# Patient Record
Sex: FEMALE | Race: WHITE | NOT HISPANIC OR LATINO | Employment: OTHER | URBAN - METROPOLITAN AREA
[De-identification: names, ages, dates, MRNs, and addresses within clinical notes are randomized per-mention and may not be internally consistent; named-entity substitution may affect disease eponyms.]

---

## 2022-01-01 ENCOUNTER — LAB REQUISITION (OUTPATIENT)
Dept: LAB | Facility: CLINIC | Age: 77
End: 2022-01-01
Payer: COMMERCIAL

## 2022-01-01 ENCOUNTER — PATIENT OUTREACH (OUTPATIENT)
Dept: CARE COORDINATION | Facility: CLINIC | Age: 77
End: 2022-01-01
Payer: COMMERCIAL

## 2022-01-01 ENCOUNTER — APPOINTMENT (OUTPATIENT)
Dept: CT IMAGING | Facility: CLINIC | Age: 77
DRG: 189 | End: 2022-01-01
Attending: EMERGENCY MEDICINE
Payer: COMMERCIAL

## 2022-01-01 ENCOUNTER — HOSPITAL ENCOUNTER (INPATIENT)
Facility: CLINIC | Age: 77
LOS: 1 days | Discharge: HOSPICE | DRG: 189 | End: 2022-06-03
Attending: EMERGENCY MEDICINE | Admitting: STUDENT IN AN ORGANIZED HEALTH CARE EDUCATION/TRAINING PROGRAM
Payer: COMMERCIAL

## 2022-01-01 VITALS
HEART RATE: 61 BPM | WEIGHT: 284 LBS | RESPIRATION RATE: 40 BRPM | DIASTOLIC BLOOD PRESSURE: 97 MMHG | OXYGEN SATURATION: 55 % | TEMPERATURE: 97.4 F | SYSTOLIC BLOOD PRESSURE: 191 MMHG

## 2022-01-01 DIAGNOSIS — E78.2 MIXED HYPERLIPIDEMIA: ICD-10-CM

## 2022-01-01 DIAGNOSIS — J96.01 ACUTE RESPIRATORY FAILURE WITH HYPOXIA AND HYPERCAPNIA (H): ICD-10-CM

## 2022-01-01 DIAGNOSIS — R32 UNSPECIFIED URINARY INCONTINENCE: ICD-10-CM

## 2022-01-01 DIAGNOSIS — M13.0 POLYARTHRITIS, UNSPECIFIED: ICD-10-CM

## 2022-01-01 DIAGNOSIS — E87.6 HYPOKALEMIA: ICD-10-CM

## 2022-01-01 DIAGNOSIS — Z71.89 OTHER SPECIFIED COUNSELING: ICD-10-CM

## 2022-01-01 DIAGNOSIS — J96.02 ACUTE RESPIRATORY FAILURE WITH HYPOXIA AND HYPERCAPNIA (H): ICD-10-CM

## 2022-01-01 DIAGNOSIS — G31.83 LEWY BODY DEMENTIA WITH BEHAVIORAL DISTURBANCE (H): ICD-10-CM

## 2022-01-01 DIAGNOSIS — F02.818 LEWY BODY DEMENTIA WITH BEHAVIORAL DISTURBANCE (H): ICD-10-CM

## 2022-01-01 DIAGNOSIS — I10 ESSENTIAL (PRIMARY) HYPERTENSION: ICD-10-CM

## 2022-01-01 LAB
ALBUMIN SERPL-MCNC: 3.1 G/DL (ref 3.5–5)
ALBUMIN UR-MCNC: 10 MG/DL
ALBUMIN UR-MCNC: NEGATIVE MG/DL
ALP SERPL-CCNC: 78 U/L (ref 45–120)
ALT SERPL W P-5'-P-CCNC: 11 U/L (ref 0–45)
ANION GAP SERPL CALCULATED.3IONS-SCNC: 1 MMOL/L (ref 3–14)
ANION GAP SERPL CALCULATED.3IONS-SCNC: 10 MMOL/L (ref 5–18)
APPEARANCE UR: ABNORMAL
APPEARANCE UR: ABNORMAL
AST SERPL W P-5'-P-CCNC: 14 U/L (ref 0–40)
BACTERIA #/AREA URNS HPF: ABNORMAL /HPF
BACTERIA #/AREA URNS HPF: ABNORMAL /HPF
BACTERIA UR CULT: ABNORMAL
BASE EXCESS BLDV CALC-SCNC: 6.9 MMOL/L (ref -7.7–1.9)
BASOPHILS # BLD AUTO: 0 10E3/UL (ref 0–0.2)
BASOPHILS NFR BLD AUTO: 0 %
BILIRUB SERPL-MCNC: 0.7 MG/DL (ref 0–1)
BILIRUB UR QL STRIP: NEGATIVE
BILIRUB UR QL STRIP: NEGATIVE
BUN SERPL-MCNC: 14 MG/DL (ref 7–30)
BUN SERPL-MCNC: 8 MG/DL (ref 8–28)
CALCIUM SERPL-MCNC: 9 MG/DL (ref 8.5–10.1)
CALCIUM SERPL-MCNC: 9.5 MG/DL (ref 8.5–10.5)
CAOX CRY #/AREA URNS HPF: ABNORMAL /HPF
CHLORIDE BLD-SCNC: 100 MMOL/L (ref 98–107)
CHLORIDE BLD-SCNC: 99 MMOL/L (ref 94–109)
CHOLEST SERPL-MCNC: 109 MG/DL
CO2 SERPL-SCNC: 34 MMOL/L (ref 22–31)
CO2 SERPL-SCNC: 40 MMOL/L (ref 20–32)
COLOR UR AUTO: ABNORMAL
COLOR UR AUTO: YELLOW
CREAT SERPL-MCNC: 0.56 MG/DL (ref 0.52–1.04)
CREAT SERPL-MCNC: 0.63 MG/DL (ref 0.6–1.1)
DEPRECATED CALCIDIOL+CALCIFEROL SERPL-MC: 75 UG/L (ref 20–75)
EOSINOPHIL # BLD AUTO: 0 10E3/UL (ref 0–0.7)
EOSINOPHIL NFR BLD AUTO: 0 %
ERYTHROCYTE [DISTWIDTH] IN BLOOD BY AUTOMATED COUNT: 14.3 % (ref 10–15)
ERYTHROCYTE [DISTWIDTH] IN BLOOD BY AUTOMATED COUNT: 15 % (ref 10–15)
FASTING STATUS PATIENT QL REPORTED: NO
FLUAV RNA SPEC QL NAA+PROBE: NEGATIVE
FLUBV RNA RESP QL NAA+PROBE: NEGATIVE
GFR SERPL CREATININE-BSD FRML MDRD: >90 ML/MIN/1.73M2
GFR SERPL CREATININE-BSD FRML MDRD: >90 ML/MIN/1.73M2
GLUCOSE BLD-MCNC: 100 MG/DL (ref 70–125)
GLUCOSE BLD-MCNC: 119 MG/DL (ref 70–99)
GLUCOSE UR STRIP-MCNC: NEGATIVE MG/DL
GLUCOSE UR STRIP-MCNC: NEGATIVE MG/DL
HCO3 BLDV-SCNC: 38 MMOL/L (ref 21–28)
HCT VFR BLD AUTO: 38.5 % (ref 35–47)
HCT VFR BLD AUTO: 42.6 % (ref 35–47)
HDLC SERPL-MCNC: 52 MG/DL
HGB BLD-MCNC: 11.4 G/DL (ref 11.7–15.7)
HGB BLD-MCNC: 12.3 G/DL (ref 11.7–15.7)
HGB UR QL STRIP: NEGATIVE
HGB UR QL STRIP: NEGATIVE
HOLD SPECIMEN: NORMAL
IMM GRANULOCYTES # BLD: 0 10E3/UL
IMM GRANULOCYTES NFR BLD: 0 %
KETONES UR STRIP-MCNC: NEGATIVE MG/DL
KETONES UR STRIP-MCNC: NEGATIVE MG/DL
LDLC SERPL CALC-MCNC: 43 MG/DL
LEUKOCYTE ESTERASE UR QL STRIP: ABNORMAL
LEUKOCYTE ESTERASE UR QL STRIP: ABNORMAL
LYMPHOCYTES # BLD AUTO: 1.6 10E3/UL (ref 0.8–5.3)
LYMPHOCYTES NFR BLD AUTO: 16 %
MCH RBC QN AUTO: 26.2 PG (ref 26.5–33)
MCH RBC QN AUTO: 26.7 PG (ref 26.5–33)
MCHC RBC AUTO-ENTMCNC: 28.9 G/DL (ref 31.5–36.5)
MCHC RBC AUTO-ENTMCNC: 29.6 G/DL (ref 31.5–36.5)
MCV RBC AUTO: 89 FL (ref 78–100)
MCV RBC AUTO: 92 FL (ref 78–100)
MONOCYTES # BLD AUTO: 0.8 10E3/UL (ref 0–1.3)
MONOCYTES NFR BLD AUTO: 8 %
MUCOUS THREADS #/AREA URNS LPF: PRESENT /LPF
MUCOUS THREADS #/AREA URNS LPF: PRESENT /LPF
NEUTROPHILS # BLD AUTO: 7.5 10E3/UL (ref 1.6–8.3)
NEUTROPHILS NFR BLD AUTO: 76 %
NITRATE UR QL: NEGATIVE
NITRATE UR QL: POSITIVE
NRBC # BLD AUTO: 0 10E3/UL
NRBC BLD AUTO-RTO: 0 /100
O2/TOTAL GAS SETTING VFR VENT: 3 %
PCO2 BLDV: 95 MM HG (ref 40–50)
PH BLDV: 7.21 [PH] (ref 7.32–7.43)
PH UR STRIP: 6 [PH] (ref 5–7)
PH UR STRIP: 6 [PH] (ref 5–7)
PLATELET # BLD AUTO: 249 10E3/UL (ref 150–450)
PLATELET # BLD AUTO: 286 10E3/UL (ref 150–450)
PO2 BLDV: 61 MM HG (ref 25–47)
POTASSIUM BLD-SCNC: 3.4 MMOL/L (ref 3.5–5)
POTASSIUM BLD-SCNC: 4.2 MMOL/L (ref 3.4–5.3)
POTASSIUM BLD-SCNC: 4.3 MMOL/L (ref 3.5–5)
PROT SERPL-MCNC: 5.9 G/DL (ref 6–8)
RBC # BLD AUTO: 4.35 10E6/UL (ref 3.8–5.2)
RBC # BLD AUTO: 4.61 10E6/UL (ref 3.8–5.2)
RBC URINE: 1 /HPF
RBC URINE: 2 /HPF
RSV RNA SPEC NAA+PROBE: NEGATIVE
SARS-COV-2 RNA RESP QL NAA+PROBE: NEGATIVE
SODIUM SERPL-SCNC: 140 MMOL/L (ref 133–144)
SODIUM SERPL-SCNC: 144 MMOL/L (ref 136–145)
SP GR UR STRIP: 1.02 (ref 1–1.03)
SP GR UR STRIP: 1.02 (ref 1–1.03)
SQUAMOUS EPITHELIAL: 14 /HPF
SQUAMOUS EPITHELIAL: 3 /HPF
TRANSITIONAL EPI: 1 /HPF
TRIGL SERPL-MCNC: 72 MG/DL
UROBILINOGEN UR STRIP-MCNC: <2 MG/DL
UROBILINOGEN UR STRIP-MCNC: <2 MG/DL
WBC # BLD AUTO: 10 10E3/UL (ref 4–11)
WBC # BLD AUTO: 9.8 10E3/UL (ref 4–11)
WBC URINE: 69 /HPF
WBC URINE: 7 /HPF

## 2022-01-01 PROCEDURE — 999N000157 HC STATISTIC RCP TIME EA 10 MIN

## 2022-01-01 PROCEDURE — 82306 VITAMIN D 25 HYDROXY: CPT | Mod: ORL | Performed by: NURSE PRACTITIONER

## 2022-01-01 PROCEDURE — 87186 SC STD MICRODIL/AGAR DIL: CPT | Mod: ORL | Performed by: FAMILY MEDICINE

## 2022-01-01 PROCEDURE — 85025 COMPLETE CBC W/AUTO DIFF WBC: CPT | Performed by: EMERGENCY MEDICINE

## 2022-01-01 PROCEDURE — 82803 BLOOD GASES ANY COMBINATION: CPT | Performed by: EMERGENCY MEDICINE

## 2022-01-01 PROCEDURE — 120N000001 HC R&B MED SURG/OB

## 2022-01-01 PROCEDURE — 94660 CPAP INITIATION&MGMT: CPT

## 2022-01-01 PROCEDURE — 84132 ASSAY OF SERUM POTASSIUM: CPT | Mod: ORL | Performed by: FAMILY MEDICINE

## 2022-01-01 PROCEDURE — 80053 COMPREHEN METABOLIC PANEL: CPT | Mod: ORL | Performed by: NURSE PRACTITIONER

## 2022-01-01 PROCEDURE — 250N000013 HC RX MED GY IP 250 OP 250 PS 637: Performed by: EMERGENCY MEDICINE

## 2022-01-01 PROCEDURE — 87637 SARSCOV2&INF A&B&RSV AMP PRB: CPT | Performed by: EMERGENCY MEDICINE

## 2022-01-01 PROCEDURE — P9603 ONE-WAY ALLOW PRORATED MILES: HCPCS | Mod: ORL | Performed by: NURSE PRACTITIONER

## 2022-01-01 PROCEDURE — 250N000009 HC RX 250: Performed by: EMERGENCY MEDICINE

## 2022-01-01 PROCEDURE — 5A09357 ASSISTANCE WITH RESPIRATORY VENTILATION, LESS THAN 24 CONSECUTIVE HOURS, CONTINUOUS POSITIVE AIRWAY PRESSURE: ICD-10-PCS | Performed by: EMERGENCY MEDICINE

## 2022-01-01 PROCEDURE — C9803 HOPD COVID-19 SPEC COLLECT: HCPCS

## 2022-01-01 PROCEDURE — 36415 COLL VENOUS BLD VENIPUNCTURE: CPT | Mod: ORL | Performed by: FAMILY MEDICINE

## 2022-01-01 PROCEDURE — 250N000011 HC RX IP 250 OP 636: Performed by: EMERGENCY MEDICINE

## 2022-01-01 PROCEDURE — 80061 LIPID PANEL: CPT | Mod: ORL | Performed by: NURSE PRACTITIONER

## 2022-01-01 PROCEDURE — 80048 BASIC METABOLIC PNL TOTAL CA: CPT | Performed by: EMERGENCY MEDICINE

## 2022-01-01 PROCEDURE — 99291 CRITICAL CARE FIRST HOUR: CPT

## 2022-01-01 PROCEDURE — 36415 COLL VENOUS BLD VENIPUNCTURE: CPT | Performed by: EMERGENCY MEDICINE

## 2022-01-01 PROCEDURE — 85027 COMPLETE CBC AUTOMATED: CPT | Mod: ORL | Performed by: NURSE PRACTITIONER

## 2022-01-01 PROCEDURE — 81001 URINALYSIS AUTO W/SCOPE: CPT | Mod: ORL | Performed by: FAMILY MEDICINE

## 2022-01-01 PROCEDURE — 36415 COLL VENOUS BLD VENIPUNCTURE: CPT | Mod: ORL | Performed by: NURSE PRACTITIONER

## 2022-01-01 PROCEDURE — 71275 CT ANGIOGRAPHY CHEST: CPT

## 2022-01-01 PROCEDURE — 99222 1ST HOSP IP/OBS MODERATE 55: CPT | Mod: AI | Performed by: STUDENT IN AN ORGANIZED HEALTH CARE EDUCATION/TRAINING PROGRAM

## 2022-01-01 PROCEDURE — 96374 THER/PROPH/DIAG INJ IV PUSH: CPT | Mod: 59

## 2022-01-01 PROCEDURE — 96376 TX/PRO/DX INJ SAME DRUG ADON: CPT

## 2022-01-01 RX ORDER — BISACODYL 10 MG
10 SUPPOSITORY, RECTAL RECTAL
Status: CANCELLED | OUTPATIENT
Start: 2022-06-05

## 2022-01-01 RX ORDER — PROCHLORPERAZINE 25 MG
12.5 SUPPOSITORY, RECTAL RECTAL EVERY 12 HOURS PRN
Status: CANCELLED | OUTPATIENT
Start: 2022-01-01

## 2022-01-01 RX ORDER — IOPAMIDOL 755 MG/ML
500 INJECTION, SOLUTION INTRAVASCULAR ONCE
Status: COMPLETED | OUTPATIENT
Start: 2022-01-01 | End: 2022-01-01

## 2022-01-01 RX ORDER — ACETAMINOPHEN 325 MG/1
650 TABLET ORAL EVERY 6 HOURS PRN
Status: CANCELLED | OUTPATIENT
Start: 2022-01-01

## 2022-01-01 RX ORDER — NALOXONE HYDROCHLORIDE 0.4 MG/ML
0.1 INJECTION, SOLUTION INTRAMUSCULAR; INTRAVENOUS; SUBCUTANEOUS
Status: CANCELLED | OUTPATIENT
Start: 2022-01-01

## 2022-01-01 RX ORDER — LIDOCAINE 40 MG/G
CREAM TOPICAL
Status: CANCELLED | OUTPATIENT
Start: 2022-01-01

## 2022-01-01 RX ORDER — ONDANSETRON 2 MG/ML
4 INJECTION INTRAMUSCULAR; INTRAVENOUS EVERY 6 HOURS PRN
Status: CANCELLED | OUTPATIENT
Start: 2022-01-01

## 2022-01-01 RX ORDER — GLYCOPYRROLATE 0.2 MG/ML
0.2 INJECTION, SOLUTION INTRAMUSCULAR; INTRAVENOUS EVERY 4 HOURS PRN
Status: CANCELLED | OUTPATIENT
Start: 2022-01-01

## 2022-01-01 RX ORDER — PROCHLORPERAZINE MALEATE 5 MG
5 TABLET ORAL EVERY 6 HOURS PRN
Status: CANCELLED | OUTPATIENT
Start: 2022-01-01

## 2022-01-01 RX ORDER — LORAZEPAM 1 MG/1
1 TABLET ORAL
Status: CANCELLED | OUTPATIENT
Start: 2022-01-01

## 2022-01-01 RX ORDER — MORPHINE SULFATE 20 MG/ML
5-10 SOLUTION ORAL
Status: CANCELLED | OUTPATIENT
Start: 2022-01-01

## 2022-01-01 RX ORDER — HALOPERIDOL 5 MG/ML
2.5 INJECTION INTRAMUSCULAR ONCE
Status: DISCONTINUED | OUTPATIENT
Start: 2022-01-01 | End: 2022-01-01

## 2022-01-01 RX ORDER — ACETAMINOPHEN 650 MG/1
650 SUPPOSITORY RECTAL EVERY 6 HOURS PRN
Status: CANCELLED | OUTPATIENT
Start: 2022-01-01

## 2022-01-01 RX ORDER — HALOPERIDOL 5 MG/ML
2.5 INJECTION INTRAMUSCULAR ONCE
Status: COMPLETED | OUTPATIENT
Start: 2022-01-01 | End: 2022-01-01

## 2022-01-01 RX ORDER — ATROPINE SULFATE 10 MG/ML
2 SOLUTION/ DROPS OPHTHALMIC EVERY 4 HOURS PRN
Status: CANCELLED | OUTPATIENT
Start: 2022-01-01

## 2022-01-01 RX ORDER — NALOXONE HYDROCHLORIDE 0.4 MG/ML
0.2 INJECTION, SOLUTION INTRAMUSCULAR; INTRAVENOUS; SUBCUTANEOUS
Status: CANCELLED | OUTPATIENT
Start: 2022-01-01

## 2022-01-01 RX ORDER — GLYCOPYRROLATE 1 MG/1
2 TABLET ORAL EVERY 4 HOURS PRN
Status: CANCELLED | OUTPATIENT
Start: 2022-01-01

## 2022-01-01 RX ORDER — LORAZEPAM 2 MG/ML
1 INJECTION INTRAMUSCULAR
Status: CANCELLED | OUTPATIENT
Start: 2022-01-01

## 2022-01-01 RX ORDER — MORPHINE SULFATE 4 MG/ML
4 INJECTION, SOLUTION INTRAMUSCULAR; INTRAVENOUS
Status: DISCONTINUED | OUTPATIENT
Start: 2022-01-01 | End: 2022-01-01 | Stop reason: HOSPADM

## 2022-01-01 RX ORDER — ONDANSETRON 4 MG/1
4 TABLET, ORALLY DISINTEGRATING ORAL EVERY 6 HOURS PRN
Status: CANCELLED | OUTPATIENT
Start: 2022-01-01

## 2022-01-01 RX ORDER — OLANZAPINE 5 MG/1
5 TABLET, ORALLY DISINTEGRATING ORAL EVERY 4 HOURS PRN
Status: DISCONTINUED | OUTPATIENT
Start: 2022-01-01 | End: 2022-01-01 | Stop reason: HOSPADM

## 2022-01-01 RX ORDER — MORPHINE SULFATE 10 MG/5ML
5-10 SOLUTION ORAL
Status: CANCELLED | OUTPATIENT
Start: 2022-01-01

## 2022-01-01 RX ORDER — MORPHINE SULFATE 10 MG/5ML
10 SOLUTION ORAL
Status: DISCONTINUED | OUTPATIENT
Start: 2022-01-01 | End: 2022-01-01 | Stop reason: HOSPADM

## 2022-01-01 RX ORDER — ACETAMINOPHEN 325 MG/10.15ML
650 LIQUID ORAL EVERY 6 HOURS PRN
Status: CANCELLED | OUTPATIENT
Start: 2022-01-01

## 2022-01-01 RX ORDER — HALOPERIDOL 5 MG/ML
2 INJECTION INTRAMUSCULAR
Status: CANCELLED | OUTPATIENT
Start: 2022-01-01

## 2022-01-01 RX ADMIN — MORPHINE SULFATE 10 MG: 10 SOLUTION ORAL at 00:22

## 2022-01-01 RX ADMIN — IOPAMIDOL 90 ML: 755 INJECTION, SOLUTION INTRAVENOUS at 20:05

## 2022-01-01 RX ADMIN — HALOPERIDOL LACTATE 2.5 MG: 5 INJECTION, SOLUTION INTRAMUSCULAR at 19:48

## 2022-01-01 RX ADMIN — OLANZAPINE 5 MG: 5 TABLET, ORALLY DISINTEGRATING ORAL at 23:10

## 2022-01-01 RX ADMIN — HALOPERIDOL LACTATE 2.5 MG: 5 INJECTION, SOLUTION INTRAMUSCULAR at 20:39

## 2022-01-01 RX ADMIN — SODIUM CHLORIDE 100 ML: 9 INJECTION, SOLUTION INTRAVENOUS at 20:04

## 2022-01-01 ASSESSMENT — ACTIVITIES OF DAILY LIVING (ADL)
ADLS_ACUITY_SCORE: 35

## 2022-06-02 PROBLEM — G31.83 LEWY BODY DEMENTIA WITH BEHAVIORAL DISTURBANCE (H): Status: ACTIVE | Noted: 2022-01-01

## 2022-06-02 PROBLEM — J96.02 ACUTE RESPIRATORY FAILURE WITH HYPOXIA AND HYPERCAPNIA (H): Status: ACTIVE | Noted: 2022-01-01

## 2022-06-02 PROBLEM — J96.01 ACUTE RESPIRATORY FAILURE WITH HYPOXIA AND HYPERCAPNIA (H): Status: ACTIVE | Noted: 2022-01-01

## 2022-06-02 PROBLEM — F02.818 LEWY BODY DEMENTIA WITH BEHAVIORAL DISTURBANCE (H): Status: ACTIVE | Noted: 2022-01-01

## 2022-06-02 NOTE — ED TRIAGE NOTES
Presents via EMS from care facility with hypoxia upon evening vital signs. 60% on RA. Up to 94% on 3 lpm O2 via nc upon arrival to ED. Hx dementia, disoriented x 4 at baseline. Hx of lung mass with no further info from ems and currently has pitting edema. 12 lead unremarkable le in route.

## 2022-06-02 NOTE — ED PROVIDER NOTES
History     Chief Complaint:  hypoxia       HPI   Nicole Gaviria is a 76 year old female who presents with shortness of breath from a group home.  Apparently she has dementia.  Today she is complaining only of pain in her buttocks.  Does not provide any other information.  EMS reports she was noted to be hypoxic with O2 sats in the 60s improved with supplemental oxygen after they arrived.    Patient's sister arrived and I was able to speak with her.  She saw the patient yesterday and noticed she seemed more confused having hallucinations.  Patient has Lewy body dementia and has been in progressive decline for a while.  She is DNR/DNI.  This was confirmed by the patient's sister.    ROS:  Review of Systems   Unable to perform ROS: Dementia          Allergies:  Amoxicillin-Pot Clavulanate  Omeprazole     Medications:    No current outpatient medications on file.      Past Medical History:    No past medical history on file.    Past Surgical History:    No past surgical history on file.     Family History:    family history is not on file.    Social History:     PCP: No primary care provider on file.     Physical Exam     Patient Vitals for the past 24 hrs:   BP Temp Temp src Pulse Resp SpO2 Weight   06/02/22 1845 (!) 191/97 -- -- 61 24 94 % --   06/02/22 1844 -- -- -- -- -- -- 128.8 kg (284 lb)   06/02/22 1832 (!) 175/104 97.4  F (36.3  C) Axillary 63 -- 96 % --   06/02/22 1830 (!) 191/89 -- -- 61 -- 95 % --   06/02/22 1815 (!) 185/87 -- -- 63 -- 97 % --   06/02/22 1800 (!) 175/104 -- -- 65 -- 95 % --   06/02/22 1758 -- -- -- -- 17 94 % --        Physical Exam  Gen: Somnolent but arouses to verbal stimuli,   Oral : Mucous membranes moist,   Nose: No rhinorhea, nasal cannula oxygen in place  Ears: External near normal, without drainage  Eyes: periorbital tissues and sclera normal   Neck: supple, no abnormal swelling  Lungs: Clear bilaterally, no tachypnea or distress, speaks full sentences  CV: Regular rate, regular  rhythm  Abd: soft, nontender, nondistended, no rebound/guarding  Ext: Large legs with symmetric edema  Skin: Stage I pressure ulcer on sacrum, yeast infection of pannus  Neuro: Somnolent, rouses to verbal stimuli, not oriented, moves 4 extremities independently  Psych: pleasant mood, normal affect      Emergency Department Course   ECG:  No results found for this or any previous visit.    Imaging:  CT Chest Pulmonary Embolism w Contrast   Final Result   IMPRESSION:   1.  Limited exam due to respiratory motion. No central or definite peripheral pulmonary artery embolism.   2.  Trace bilateral pleural effusions with mild adjacent atelectasis/infiltrates.   3.  Bilateral multilobar solid pulmonary nodules measuring up to 9 mm. These may be postinfectious or postinflammatory. However, pulmonary metastases cannot be excluded. Recommend correlation and at least short interval CT follow-up within 3 months.   4.  Bilateral indeterminate adrenal gland nodules. Recommend attention on short interval follow-up. Nonemergent further evaluation with adrenal CT may be helpful.   5.  Mild mediastinal adenopathy.      REFERENCE:   Guidelines for Management of Incidental Pulmonary Nodules Detected on CT Images: From the Fleischner Society 2017.    Guidelines apply to incidental nodules in patients who are 35 years or older.   Guidelines do not apply to lung cancer screening, patients with immunosuppression, or patients with known primary cancer.      MULTIPLE NODULES      Nodule size 6 mm or larger   Low-risk patients: Follow-up CT at 3-6 months, then consider CT at 18-24 months.   High-risk patients: Follow-up CT at 3-6 months, then at 18-24 months if no change.   -Use most suspicious nodule as guide to management.            Report per radiology    Laboratory:  Labs Ordered and Resulted from Time of ED Arrival to Time of ED Departure   BASIC METABOLIC PANEL - Abnormal       Result Value    Sodium 140      Potassium 4.2      Chloride  99      Carbon Dioxide (CO2) 40 (*)     Anion Gap 1 (*)     Urea Nitrogen 14      Creatinine 0.56      Calcium 9.0      Glucose 119 (*)     GFR Estimate >90     BLOOD GAS VENOUS - Abnormal    pH Venous 7.21 (*)     pCO2 Venous 95 (*)     pO2 Venous 61 (*)     Bicarbonate Venous 38 (*)     Base Excess/Deficit (+/-) 6.9 (*)     FIO2 3     CBC WITH PLATELETS AND DIFFERENTIAL - Abnormal    WBC Count 10.0      RBC Count 4.61      Hemoglobin 12.3      Hematocrit 42.6      MCV 92      MCH 26.7      MCHC 28.9 (*)     RDW 15.0      Platelet Count 286      % Neutrophils 76      % Lymphocytes 16      % Monocytes 8      % Eosinophils 0      % Basophils 0      % Immature Granulocytes 0      NRBCs per 100 WBC 0      Absolute Neutrophils 7.5      Absolute Lymphocytes 1.6      Absolute Monocytes 0.8      Absolute Eosinophils 0.0      Absolute Basophils 0.0      Absolute Immature Granulocytes 0.0      Absolute NRBCs 0.0     INFLUENZA A/B & SARS-COV2 PCR MULTIPLEX - Normal    Influenza A PCR Negative      Influenza B PCR Negative      RSV PCR Negative      SARS CoV2 PCR Negative          Procedures       Emergency Department Course:             Reviewed:  I reviewed nursing notes, vitals and past medical history    Assessments:      Consults:       Interventions:  Medications   haloperidol lactate (HALDOL) injection 2.5 mg (has no administration in time range)   CT SCAN FLUSH (100 mLs Intravenous Given 6/2/22 2004)   iopamidol (ISOVUE-370) solution 500 mL (90 mLs Intravenous Given 6/2/22 2005)   haloperidol lactate (HALDOL) injection 2.5 mg (2.5 mg Intravenous Given 6/2/22 1948)        Disposition:  The patient was admitted to the hospital under the care of hospitalist    Impression & Plan    CMS Diagnoses:   and None      Medical Decision Making:  Patient presents emergency department with respiratory failure with hypoxia and hypercapnia.  History of Lewy body dementia and progressive decline.  Initially no family was available so  labs and imaging was obtained.  Patient needed 2.5 of Haldol to allow for a CT scan of her chest.  We temporarily tried the patient on BiPAP which she really did not tolerate she continually pulled out the device and tried to remove it.  She is not oriented to her responding to commands.  Had a good long conversation with the patient's sister and they reviewed the patient's healthcare directives and confirmed that patient is DNR/DNI.  At this point with patient's prior history and patient not wanting aggressive measures the patient's sister, in consultation with her brother over the telephone and myself decided to focus on comfort cares for the patient.  We will do nasal cannula oxygen for comfort as needed, will provide some Haldol and morphine as needed for comfort and to help keep the patient safely in bed.  Contacted hospitalist is in agreement for inpatient management.        Diagnosis:    ICD-10-CM    1. Acute respiratory failure with hypoxia and hypercapnia (H)  J96.01     J96.02    2. Lewy body dementia with behavioral disturbance (H)  G31.83     F02.81         Discharge Medications:  New Prescriptions    No medications on file        6/2/2022   Hans Corona,*        Hans Corona MD  06/02/22 2038

## 2022-06-03 NOTE — ED NOTES
[facility]  ED Nurse Handoff Report    Nicole Gaviria is a 76 year old female   ED Chief complaint: hypoxia  . ED Diagnosis:   Final diagnoses:   Acute respiratory failure with hypoxia and hypercapnia (H)   Lewy body dementia with behavioral disturbance (H)     Allergies:   Allergies   Allergen Reactions     Amoxicillin-Pot Clavulanate Diarrhea     Other reaction(s): Gastrointestinal     Omeprazole Other (See Comments)     PN: dizziness       Code Status: Comfort Care  Activity level - Baseline/Home:  Total Care. Activity Level - Current:   Total Care. Lift room needed: no, on comfort cares Bariatric: No   Needed: No   Isolation: No. Infection: Not Applicable.     Vital Signs:   Vitals:    06/02/22 1832 06/02/22 1844 06/02/22 1845 06/02/22 2000   BP: (!) 175/104  (!) 191/97    Pulse: 63  61    Resp:   24    Temp: 97.4  F (36.3  C)      TempSrc: Axillary      SpO2: 96%  94% 96%   Weight:  128.8 kg (284 lb)         Cardiac Rhythm:  ,      Pain level:    Patient confused: Yes. Patient Falls Risk: Yes.   Elimination Status: due to void   Patient Report - Initial Complaint: Due to void. Focused Assessment:   Presents via EMS from care facility with hypoxia upon evening vital signs. 60% on RA. Up to 94% on 3 lpm O2 via nc upon arrival to ED. Hx dementia, disoriented x 4 at baseline. Hx of lung mass with no further info from ems and currently has pitting edema. 12 lead unremarkable le in route.   Pt has converted to comfort cares per MD. Pt would not leave Bipap on, nor would she keep any monitoring devices on. Sister at bedside. OK'd per MD to keep all monitoring devices and oxygen/bipap off for patient's comfort. Haldol given. Lights dimmed, warm blankets given to patient, and this RN gave patient lotion massage to arm and hands and patient fell asleep. Awaiting family to arrive. Report given to CR Ramon. Notified charge RN of situation. PRN morphine available.  MO     Pt's facility called for  updates. Pt resting in bed comfortably. No signs of distress/discomfort noted. Family at bedside and ok to give updates to the facility.     Nurse Fe calling from the [facility] updated. Nurse verbalizes understanding.   Tests Performed:   CT Chest Pulmonary Embolism w Contrast   Final Result   IMPRESSION:   1.  Limited exam due to respiratory motion. No central or definite peripheral pulmonary artery embolism.   2.  Trace bilateral pleural effusions with mild adjacent atelectasis/infiltrates.   3.  Bilateral multilobar solid pulmonary nodules measuring up to 9 mm. These may be postinfectious or postinflammatory. However, pulmonary metastases cannot be excluded. Recommend correlation and at least short interval CT follow-up within 3 months.   4.  Bilateral indeterminate adrenal gland nodules. Recommend attention on short interval follow-up. Nonemergent further evaluation with adrenal CT may be helpful.   5.  Mild mediastinal adenopathy.      REFERENCE:   Guidelines for Management of Incidental Pulmonary Nodules Detected on CT Images: From the Fleischner Society 2017.    Guidelines apply to incidental nodules in patients who are 35 years or older.   Guidelines do not apply to lung cancer screening, patients with immunosuppression, or patients with known primary cancer.      MULTIPLE NODULES      Nodule size 6 mm or larger   Low-risk patients: Follow-up CT at 3-6 months, then consider CT at 18-24 months.   High-risk patients: Follow-up CT at 3-6 months, then at 18-24 months if no change.   -Use most suspicious nodule as guide to management.           . Abnormal Results:   Labs Ordered and Resulted from Time of ED Arrival to Time of ED Departure   BASIC METABOLIC PANEL - Abnormal       Result Value    Sodium 140      Potassium 4.2      Chloride 99      Carbon Dioxide (CO2) 40 (*)     Anion Gap 1 (*)     Urea Nitrogen 14      Creatinine 0.56      Calcium 9.0      Glucose 119 (*)     GFR Estimate >90     BLOOD GAS  VENOUS - Abnormal    pH Venous 7.21 (*)     pCO2 Venous 95 (*)     pO2 Venous 61 (*)     Bicarbonate Venous 38 (*)     Base Excess/Deficit (+/-) 6.9 (*)     FIO2 3     CBC WITH PLATELETS AND DIFFERENTIAL - Abnormal    WBC Count 10.0      RBC Count 4.61      Hemoglobin 12.3      Hematocrit 42.6      MCV 92      MCH 26.7      MCHC 28.9 (*)     RDW 15.0      Platelet Count 286      % Neutrophils 76      % Lymphocytes 16      % Monocytes 8      % Eosinophils 0      % Basophils 0      % Immature Granulocytes 0      NRBCs per 100 WBC 0      Absolute Neutrophils 7.5      Absolute Lymphocytes 1.6      Absolute Monocytes 0.8      Absolute Eosinophils 0.0      Absolute Basophils 0.0      Absolute Immature Granulocytes 0.0      Absolute NRBCs 0.0     INFLUENZA A/B & SARS-COV2 PCR MULTIPLEX - Normal    Influenza A PCR Negative      Influenza B PCR Negative      RSV PCR Negative      SARS CoV2 PCR Negative       .   Treatments provided: See pt's chart.  Family Comments: Family at bedside. Supportive.  OBS brochure/video discussed/provided to patient:  No  ED Medications:   Medications   morphine (PF) injection 4 mg (has no administration in time range)   CT SCAN FLUSH (100 mLs Intravenous Given 6/2/22 2004)   iopamidol (ISOVUE-370) solution 500 mL (90 mLs Intravenous Given 6/2/22 2005)   haloperidol lactate (HALDOL) injection 2.5 mg (2.5 mg Intravenous Given 6/2/22 1948)   haloperidol lactate (HALDOL) injection 2.5 mg (2.5 mg Intravenous Given 6/2/22 2039)     Drips infusing:  No  For the majority of the shift, the patient's behavior Green. Interventions performed were NA.    Sepsis treatment initiated: No     Patient tested for COVID 19 prior to admission: YES    ED Nurse Name/Phone Number: Tristen Medina RN,   9:56 PM

## 2022-06-03 NOTE — ED NOTES
Pt's facility called for updates. Pt resting in bed comfortably. No signs of distress/discomfort noted. Family at bedside and ok to give updates to the facility.    Nurse Fe calling from the [facility] updated. Nurse verbalizes understanding.

## 2022-06-03 NOTE — PROGRESS NOTES
BiPAP ordered after [facility] CO2 95. Family at bedside states pt has CPAP/BiPAP prescribed but does not wear. Patient on 16/8, 50%.    Javi Stout, RT on 6/2/2022 at 8:40 PM

## 2022-06-03 NOTE — ED NOTES
Pt discharged home to [place]. Sister, Gina, present upon pt leaving hospital. Discharge papers faxed to [phone] upon Simona's request. Pt alert and repositioning self in bed upon departure.

## 2022-06-03 NOTE — ED PROVIDER NOTES
Pt signed out to me by Dr. Mcclellan. In short, pt initially presented with dyspnea found to be significantly hypoxic. Poorly tolerated bipap. Ultimately, after informed conversations with family, decision was made to forgo aggressive measures and transition to comfort care. This AM, at time of sign out, patient is satting 50-70% on room air with labored breathing. She is requesting breakfast which was provided. Director of Nursing at pt's care facility has asked for us to discharge patient back to facility and she notes she has Hospice NP there to initiate Hospice. Pt's brother and sister are both in agreement with this plan. Pt therefore discharged via EMS back to care facility. Also looped in hospitalist service, Dr. Paris, who is in agreement with this plan as well.      Asael Herndon MD  06/03/22 9049

## 2022-06-03 NOTE — ED NOTES
Pt resting in bed. Family at bedside and showed concerns for pt safety as pt can get agitated at times. PRN medications available. Family wondering if pt can be closer to nursing station.    Plan reviewed with charge nurse and OK to move pt to room closer to nursing station.

## 2022-06-03 NOTE — DISCHARGE INSTRUCTIONS
Family has decided upon comfort/care hospice.  Pt to be admitted to hospice upon return to [facility].     Return to hospital for any concerns regarding services.

## 2022-06-03 NOTE — ED NOTES
Pt has converted to comfort cares per MD. Pt would not leave Bipap on, nor would she keep any monitoring devices on. Sister at bedside. OK'd per MD to keep all monitoring devices and oxygen/bipap off for patient's comfort. Haldol given. Lights dimmed, warm blankets given to patient, and this RN gave patient lotion massage to arm and hands and patient fell asleep. Awaiting family to arrive. Report given to CR Ramon. Notified charge RN of situation. PRN morphine available.

## 2022-06-03 NOTE — ED NOTES
Spoke with pt's sister and informed her that the plan is to transfer pt back to facility is pt's condition remains the same by the time EMS can arrive to transport her. DON from [facility] agrees to this plan.     Dr. Herndon present for phone call with PAULA Jarvis at [facility]. Dr. Herndon informed Simona that we will be calling for ambulance transport back to facility.

## 2022-06-03 NOTE — ED NOTES
Pt is alert and eating the broccoli and mushroom omelette that she requested for herself. Pt denies pain.

## 2022-06-03 NOTE — H&P
[facility]    History and Physical - Hospitalist Service       Date of Admission:  6/2/2022    Assessment & Plan      Nicole Gaviria is a 76 year old female admitted on 6/2/2022.     She has Lewy body dementia and lives in assisted living called [facility] in [place].  She has been having worsening of her dementia over the last several months with severe visual hallucinations and has been total care dependent.  She was brought in today for worsening shortness of breath and some buttock pain.  She is not able to give any more information.    Her initial vitals: Blood pressure 175/104, heart rate of 65, oxygen saturation of 94% on 3 L via nasal cannula.  Venous gas showed pH/PCO2 of 7.21/95.  CT chest was negative for PE but had bilateral multilobar pulmonary nodules up to 9 mm, infectious versus metastatic.  He also had bilateral trace pleural effusions with mild adjacent atelectasis/infiltrates. She tested negative for COVID-19, influenza A/B and RSV.  She had an unremarkable CBC and BMP    She was placed on BiPAP initially but then family came by and as per your wishes she was transitioned to comfort cares and bicarb was taken off.  At time of my examination patient winces to painful stimuli but otherwise is unresponsive.    She was transitioned to comfort cares after discussion with her family which included her sister, brother and 2 nieces.  Her  has passed away and she has no kids.    Plan.    1. Acute on chronic hypercapnic respiratory failure.     Unclear etiology, might be due to progressive dementia versus due to drugs used to control her delirium and agitation..  2. Advanced Lewy body dementia.  Transition to comfort cares.  3. Uncontrolled hypertension.           Diet:  Allow as tolerated with  DVT Prophylaxis: Not indicated  Arias Catheter: Not present  Central Lines: None  Cardiac Monitoring: None  Code Status:  DNR/DNI    Clinically Significant Risk Factors Present on Admission                       Disposition Plan   Expected Discharge:  in 1 to 2 days.  She might need to go back to her facility on hospice cares.  Anticipated discharge location:  Awaiting care coordination huddle  Delays:            The patient's care was discussed with the Patient.    Rob Jackson MD  Hospitalist Service  [facility]  Securely message with the Vocera Web Console (learn more here)  Text page via [facility] Paging/Directory         ______________________________________________________________________    Chief Complaint   Shortness of breath and decreased level of consciousness.    History obtained from ER physician and her family including her sister, brother and 2 nieces.    History of Present Illness   Nicole Gaviria is a 76 year old female admitted on 6/2/2022.     She has Lewy body dementia and lives in assisted living called [facility] in [place].  She has been having worsening of her dementia over the last several months with severe visual hallucinations and has been total care dependent.  She was brought in today for worsening shortness of breath and some buttock pain.  She is not able to give any more information.    Her initial vitals: Blood pressure 175/104, heart rate of 65, oxygen saturation of 94% on 3 L via nasal cannula.  Venous gas showed pH/PCO2 of 7.21/95.  CT chest was negative for PE but had bilateral multilobar pulmonary nodules up to 9 mm, infectious versus metastatic.  He also had bilateral trace pleural effusions with mild adjacent atelectasis/infiltrates. She tested negative for COVID-19, influenza A/B and RSV.  She had an unremarkable CBC and BMP    She was placed on BiPAP initially but then family came by and as per your wishes she was transitioned to comfort cares and bicarb was taken off.  At time of my examination patient winces to painful stimuli but otherwise is unresponsive.    She was transitioned to comfort cares after discussion with her family which included  her sister, brother and 2 nieces.  Her  has passed away and she has no kids.    Review of Systems    The 10 point Review of Systems is negative other than noted in the HPI or here.     Past Medical History    I have reviewed this patient's medical history and updated it with pertinent information if needed.   No past medical history on file.    Past Surgical History   I have reviewed this patient's surgical history and updated it with pertinent information if needed.  No past surgical history on file.    Social History   I have reviewed this patient's social history and updated it with pertinent information if needed.       Family History     No significant family history, including no history of: Lewy body dementia    Prior to Admission Medications   None     Allergies   Allergies   Allergen Reactions     Amoxicillin-Pot Clavulanate Diarrhea     Other reaction(s): Gastrointestinal     Omeprazole Other (See Comments)     PN: dizziness       Physical Exam   Vital Signs: Temp: 97.4  F (36.3  C) Temp src: Axillary BP: (!) 191/97 Pulse: 61   Resp: 24 SpO2: 96 % O2 Device: None (Room air) (pt comfort cares and will not leave oxygen on - MD aware.) Oxygen Delivery: 3 LPM  Weight: 284 lbs 0 oz    General Appearance: Patient is resting comfortably.  Eyes: Pupils small but reactive.  HEENT: Moist mucosa  Respiratory: Decreased breath sounds bilaterally  Cardiovascular: Distant heart sounds  GI: Nontender  Lymph/Hematologic: Not examined  Genitourinary: Not examined  Skin: No rash or wounds  Musculoskeletal: Bilateral lower extremity edema  Neurologic: Very lethargic and grimaces on painful stimuli.  Psychiatric: Not assessable    Data   Data reviewed today: I reviewed all medications, new labs and imaging results over the last 24 hours.    Recent Labs   Lab 06/02/22  1817   WBC 10.0   HGB 12.3   MCV 92         POTASSIUM 4.2   CHLORIDE 99   CO2 40*   BUN 14   CR 0.56   ANIONGAP 1*   JORGE 9.0   *      Recent Results (from the past 24 hour(s))   CT Chest Pulmonary Embolism w Contrast    Narrative    EXAM: CT CHEST PULMONARY EMBOLISM W CONTRAST  LOCATION: [facility]  DATE/TIME: 6/2/2022 7:35 PM    INDICATION: PE suspected, high prob, shortness of breath  COMPARISON: None.  TECHNIQUE: CT chest pulmonary angiogram during arterial phase injection of IV contrast. Multiplanar reformats and MIP reconstructions were performed. Dose reduction techniques were used.   CONTRAST: 90mL Isovue 370    FINDINGS:  ANGIOGRAM CHEST: Limited due to respiratory motion. Patient had difficulty following breath-hold instructions. No central or definite peripheral pulmonary artery embolism. Thoracic aorta is negative for dissection. No CT evidence of right heart strain.    LUNGS AND PLEURA: Trace bilateral pleural effusions with mild adjacent atelectasis/infiltrates. Mild lingular and right middle lobar patchy airspace opacities. Mosaic lung attenuation. Multiple bilateral pulmonary nodules including a dominant 8 mm right   middle lobe nodule image 143 series 13 and dominant 8 mm left lower lobe nodule image 139. Dominant 9 mm left upper lobe nodule image 121.    MEDIASTINUM/AXILLAE: Mild mediastinal adenopathy with a dominant precarinal node measuring 10 mm short axis dimension. Mild cardiomegaly. No pericardial effusion.    CORONARY ARTERY CALCIFICATION: None.    UPPER ABDOMEN: Bilateral indeterminate adrenal nodules including a dominant 2.4 cm left adrenal nodule and 2.0 cm right adrenal gland nodule.    MUSCULOSKELETAL: Spinal degenerative changes. Osseous demineralization.      Impression    IMPRESSION:  1.  Limited exam due to respiratory motion. No central or definite peripheral pulmonary artery embolism.  2.  Trace bilateral pleural effusions with mild adjacent atelectasis/infiltrates.  3.  Bilateral multilobar solid pulmonary nodules measuring up to 9 mm. These may be postinfectious or postinflammatory.  However, pulmonary metastases cannot be excluded. Recommend correlation and at least short interval CT follow-up within 3 months.  4.  Bilateral indeterminate adrenal gland nodules. Recommend attention on short interval follow-up. Nonemergent further evaluation with adrenal CT may be helpful.  5.  Mild mediastinal adenopathy.    REFERENCE:  Guidelines for Management of Incidental Pulmonary Nodules Detected on CT Images: From the Fleischner Society 2017.   Guidelines apply to incidental nodules in patients who are 35 years or older.  Guidelines do not apply to lung cancer screening, patients with immunosuppression, or patients with known primary cancer.    MULTIPLE NODULES    Nodule size 6 mm or larger  Low-risk patients: Follow-up CT at 3-6 months, then consider CT at 18-24 months.  High-risk patients: Follow-up CT at 3-6 months, then at 18-24 months if no change.  -Use most suspicious nodule as guide to management.

## 2022-06-03 NOTE — ED NOTES
Pt restless and agitated. Pt pulled out IV. MD made aware and ok to leave IV out for pt's comfort.  Oral PRNs ordered, see MAR.

## 2022-06-03 NOTE — PROGRESS NOTES
Patient admitted for comfort care.  Patient left without being seen by me.  Patient was managed in the ED by ED provider and was discharged by them.

## 2022-06-03 NOTE — ED NOTES
Received phone call from Simona, [phone], Nursing Director at [facility], pt's Assisted Living.  Per Simona, pt can return to [facility] on Hospice if she is comfort cares only.

## 2022-06-04 NOTE — PROGRESS NOTES
Clinic Care Coordination Contact    Background: Care Coordination referral placed from [facility] discharge report for reason of patient meeting criteria for a TCM outreach call by [facility] team.    Assessment: Upon chart review, [facility] Team member will cancel/close the referral for TCM outreach due to reason below:    Patient has discharged to a hospice.  Plan: Care Coordination referral for TCM outreach canceled.    Selene Smalls RN  [facility], [facility]